# Patient Record
Sex: MALE | Race: WHITE | NOT HISPANIC OR LATINO | Employment: FULL TIME | URBAN - METROPOLITAN AREA
[De-identification: names, ages, dates, MRNs, and addresses within clinical notes are randomized per-mention and may not be internally consistent; named-entity substitution may affect disease eponyms.]

---

## 2018-01-14 ENCOUNTER — APPOINTMENT (OUTPATIENT)
Dept: GENERAL RADIOLOGY | Facility: HOSPITAL | Age: 53
End: 2018-01-14

## 2018-01-14 ENCOUNTER — HOSPITAL ENCOUNTER (EMERGENCY)
Facility: HOSPITAL | Age: 53
Discharge: HOME | End: 2018-01-14
Attending: EMERGENCY MEDICINE | Admitting: EMERGENCY MEDICINE

## 2018-01-14 VITALS
WEIGHT: 315 LBS | BODY MASS INDEX: 49.44 KG/M2 | SYSTOLIC BLOOD PRESSURE: 156 MMHG | DIASTOLIC BLOOD PRESSURE: 76 MMHG | RESPIRATION RATE: 18 BRPM | TEMPERATURE: 98.9 F | HEIGHT: 67 IN | OXYGEN SATURATION: 99 % | HEART RATE: 58 BPM

## 2018-01-14 DIAGNOSIS — R06.02 SHORTNESS OF BREATH: Primary | ICD-10-CM

## 2018-01-14 LAB
ALBUMIN SERPL-MCNC: 4.2 G/DL (ref 3.5–5.2)
ALBUMIN/GLOB SERPL: 1.1 G/DL
ALP SERPL-CCNC: 82 U/L (ref 39–117)
ALT SERPL W P-5'-P-CCNC: 49 U/L (ref 1–41)
ANION GAP SERPL CALCULATED.3IONS-SCNC: 17.4 MMOL/L
AST SERPL-CCNC: 27 U/L (ref 1–40)
BASOPHILS # BLD AUTO: 0.04 10*3/MM3 (ref 0–0.2)
BASOPHILS NFR BLD AUTO: 0.4 % (ref 0–1.5)
BILIRUB SERPL-MCNC: 0.5 MG/DL (ref 0.1–1.2)
BUN BLD-MCNC: 24 MG/DL (ref 6–20)
BUN/CREAT SERPL: 21.8 (ref 7–25)
CALCIUM SPEC-SCNC: 9.3 MG/DL (ref 8.6–10.5)
CHLORIDE SERPL-SCNC: 99 MMOL/L (ref 98–107)
CO2 SERPL-SCNC: 23.6 MMOL/L (ref 22–29)
CREAT BLD-MCNC: 1.1 MG/DL (ref 0.76–1.27)
DEPRECATED RDW RBC AUTO: 48.3 FL (ref 37–54)
EOSINOPHIL # BLD AUTO: 0.29 10*3/MM3 (ref 0–0.7)
EOSINOPHIL NFR BLD AUTO: 2.7 % (ref 0.3–6.2)
ERYTHROCYTE [DISTWIDTH] IN BLOOD BY AUTOMATED COUNT: 14.8 % (ref 11.5–14.5)
GFR SERPL CREATININE-BSD FRML MDRD: 71 ML/MIN/1.73
GLOBULIN UR ELPH-MCNC: 3.8 GM/DL
GLUCOSE BLD-MCNC: 102 MG/DL (ref 65–99)
HCT VFR BLD AUTO: 51.2 % (ref 40.4–52.2)
HGB BLD-MCNC: 16.8 G/DL (ref 13.7–17.6)
HOLD SPECIMEN: NORMAL
IMM GRANULOCYTES # BLD: 0.03 10*3/MM3 (ref 0–0.03)
IMM GRANULOCYTES NFR BLD: 0.3 % (ref 0–0.5)
LYMPHOCYTES # BLD AUTO: 2.8 10*3/MM3 (ref 0.9–4.8)
LYMPHOCYTES NFR BLD AUTO: 26.4 % (ref 19.6–45.3)
MCH RBC QN AUTO: 29.4 PG (ref 27–32.7)
MCHC RBC AUTO-ENTMCNC: 32.8 G/DL (ref 32.6–36.4)
MCV RBC AUTO: 89.7 FL (ref 79.8–96.2)
MONOCYTES # BLD AUTO: 0.8 10*3/MM3 (ref 0.2–1.2)
MONOCYTES NFR BLD AUTO: 7.5 % (ref 5–12)
NEUTROPHILS # BLD AUTO: 6.64 10*3/MM3 (ref 1.9–8.1)
NEUTROPHILS NFR BLD AUTO: 62.7 % (ref 42.7–76)
NT-PROBNP SERPL-MCNC: 36.9 PG/ML (ref 0–900)
PLATELET # BLD AUTO: 220 10*3/MM3 (ref 140–500)
PMV BLD AUTO: 10.8 FL (ref 6–12)
POTASSIUM BLD-SCNC: 4.1 MMOL/L (ref 3.5–5.2)
PROT SERPL-MCNC: 8 G/DL (ref 6–8.5)
RBC # BLD AUTO: 5.71 10*6/MM3 (ref 4.6–6)
SODIUM BLD-SCNC: 140 MMOL/L (ref 136–145)
TROPONIN T SERPL-MCNC: <0.01 NG/ML (ref 0–0.03)
TROPONIN T SERPL-MCNC: <0.01 NG/ML (ref 0–0.03)
WBC NRBC COR # BLD: 10.6 10*3/MM3 (ref 4.5–10.7)
WHOLE BLOOD HOLD SPECIMEN: NORMAL

## 2018-01-14 PROCEDURE — 93010 ELECTROCARDIOGRAM REPORT: CPT | Performed by: INTERNAL MEDICINE

## 2018-01-14 PROCEDURE — 83880 ASSAY OF NATRIURETIC PEPTIDE: CPT | Performed by: EMERGENCY MEDICINE

## 2018-01-14 PROCEDURE — 80053 COMPREHEN METABOLIC PANEL: CPT | Performed by: EMERGENCY MEDICINE

## 2018-01-14 PROCEDURE — 71046 X-RAY EXAM CHEST 2 VIEWS: CPT

## 2018-01-14 PROCEDURE — 84484 ASSAY OF TROPONIN QUANT: CPT | Performed by: EMERGENCY MEDICINE

## 2018-01-14 PROCEDURE — 85025 COMPLETE CBC W/AUTO DIFF WBC: CPT | Performed by: EMERGENCY MEDICINE

## 2018-01-14 PROCEDURE — 93005 ELECTROCARDIOGRAM TRACING: CPT | Performed by: EMERGENCY MEDICINE

## 2018-01-14 PROCEDURE — 99284 EMERGENCY DEPT VISIT MOD MDM: CPT

## 2018-01-14 RX ORDER — SODIUM CHLORIDE 0.9 % (FLUSH) 0.9 %
10 SYRINGE (ML) INJECTION AS NEEDED
Status: DISCONTINUED | OUTPATIENT
Start: 2018-01-14 | End: 2018-01-14 | Stop reason: HOSPADM

## 2018-01-14 NOTE — ED PROVIDER NOTES
" EMERGENCY DEPARTMENT ENCOUNTER    CHIEF COMPLAINT  Chief Complaint: SOA  History given by: Pt  History limited by: Nothing  Room Number: 41/41  PMD: Jesse Rae MD      HPI:  Pt is a 52 y.o. male with a hx of SVT and hypertension who presents complaining of a sudden onset of SOA onset about four hours ago which lasted about 45 minutes. He also complains of light-headedness and tingling in his bilateral upper extremities which has now resolved. Pt states he also has been experiencing similar episodes throughout the week which lasted a few seconds each. He reports hx of childhood asthma and his most recent \"asthma attack\" being a couple of years ago. Pt denies CP, cough, sore throat, N/V/D, or any other symptoms at this time.    Duration:  Four hours  Onset: sudden  Timing: constant  Location: n/a  Radiation: none  Quality: \"SOA\"  Intensity/Severity: moderate  Progression: resolved  Associated Symptoms: light-headedness and tingling in bilateral upper extremities  Aggravating Factors: none  Alleviating Factors: none  Previous Episodes: none  Treatment before arrival: Pt received no treatment PTA.    PAST MEDICAL HISTORY  Active Ambulatory Problems     Diagnosis Date Noted   • No Active Ambulatory Problems     Resolved Ambulatory Problems     Diagnosis Date Noted   • No Resolved Ambulatory Problems     Past Medical History:   Diagnosis Date   • Cellulitis    • Hypertension    • Obesity    • Psoriasis    • SVT (supraventricular tachycardia)        PAST SURGICAL HISTORY  Past Surgical History:   Procedure Laterality Date   • CHOLECYSTECTOMY     • EYE SURGERY     • GASTRIC BANDING         FAMILY HISTORY  History reviewed. No pertinent family history.    SOCIAL HISTORY  Social History     Social History   • Marital status:      Spouse name: N/A   • Number of children: N/A   • Years of education: N/A     Occupational History   • Not on file.     Social History Main Topics   • Smoking status: Current Some Day " Smoker     Types: Cigars   • Smokeless tobacco: Not on file   • Alcohol use Yes      Comment: 1-2 daily   • Drug use: No   • Sexual activity: Defer     Other Topics Concern   • Not on file     Social History Narrative   • No narrative on file       ALLERGIES  Review of patient's allergies indicates no known allergies.    REVIEW OF SYSTEMS  Review of Systems   Constitutional: Negative for activity change, appetite change and fever.   HENT: Negative for congestion and sore throat.    Eyes: Negative.    Respiratory: Positive for shortness of breath. Negative for cough.    Cardiovascular: Negative for chest pain and leg swelling.   Gastrointestinal: Negative for abdominal pain, diarrhea and vomiting.   Endocrine: Negative.    Genitourinary: Negative for decreased urine volume and dysuria.   Musculoskeletal: Negative for neck pain.   Skin: Negative for rash and wound.   Allergic/Immunologic: Negative.    Neurological: Positive for light-headedness and numbness (described as tingling in bilateral upper extremities). Negative for weakness and headaches.   Hematological: Negative.    Psychiatric/Behavioral: Negative.    All other systems reviewed and are negative.      PHYSICAL EXAM  ED Triage Vitals   Temp Heart Rate Resp BP SpO2   01/14/18 1448 01/14/18 1448 01/14/18 1448 01/14/18 1456 01/14/18 1448   99 °F (37.2 °C) 100 18 175/96 100 %      Temp src Heart Rate Source Patient Position BP Location FiO2 (%)   01/14/18 1448 01/14/18 1448 01/14/18 1456 01/14/18 1456 --   Tympanic Monitor Sitting Left arm        Physical Exam   Constitutional: He is oriented to person, place, and time and well-developed, well-nourished, and in no distress. No distress.   HENT:   Head: Normocephalic and atraumatic.   Eyes: EOM are normal. Pupils are equal, round, and reactive to light.   Neck: Normal range of motion. Neck supple.   Cardiovascular: Normal rate, regular rhythm and normal heart sounds.    Pulmonary/Chest: Effort normal and breath  sounds normal. No respiratory distress.   Abdominal: Soft. There is no tenderness. There is no rebound and no guarding.   Musculoskeletal: Normal range of motion. He exhibits edema (trace edema). He exhibits no tenderness (no calf tenderness).   Neurological: He is alert and oriented to person, place, and time. He has normal sensation and normal strength.   Skin: Skin is warm and dry.   Psychiatric: Mood and affect normal.   Nursing note and vitals reviewed.      LAB RESULTS  Lab Results (last 24 hours)     Procedure Component Value Units Date/Time    CBC & Differential [[id number]] Collected:  01/14/18 1509    Specimen:  Blood Updated:  01/14/18 1548    Narrative:       The following orders were created for panel order CBC & Differential.  Procedure                               Abnormality         Status                     ---------                               -----------         ------                     CBC Auto Differential[[id number]]        Abnormal            Final result                 Please view results for these tests on the individual orders.    Comprehensive Metabolic Panel [[id number]]  (Abnormal) Collected:  01/14/18 1509    Specimen:  Blood Updated:  01/14/18 1609     Glucose 102 (H) mg/dL      BUN 24 (H) mg/dL      Creatinine 1.10 mg/dL      Sodium 140 mmol/L      Potassium 4.1 mmol/L      Chloride 99 mmol/L      CO2 23.6 mmol/L      Calcium 9.3 mg/dL      Total Protein 8.0 g/dL      Albumin 4.20 g/dL      ALT (SGPT) 49 (H) U/L      AST (SGOT) 27 U/L      Alkaline Phosphatase 82 U/L      Total Bilirubin 0.5 mg/dL      eGFR Non African Amer 71 mL/min/1.73      Globulin 3.8 gm/dL      A/G Ratio 1.1 g/dL      BUN/Creatinine Ratio 21.8     Anion Gap 17.4 mmol/L     BNP [[id number]]  (Normal) Collected:  01/14/18 1509    Specimen:  Blood Updated:  01/14/18 1607     proBNP 36.9 pg/mL     Narrative:       Among patients with dyspnea, NT-proBNP is highly sensitive for the detection of acute congestive  heart failure. In addition NT-proBNP of <300 pg/ml effectively rules out acute congestive heart failure with 99% negative predictive value.    Troponin [[id number]]  (Normal) Collected:  01/14/18 1509    Specimen:  Blood Updated:  01/14/18 1609     Troponin T <0.010 ng/mL     Narrative:       Troponin T Reference Ranges:  Less than 0.03 ng/mL:    Negative for AMI  0.03 to 0.09 ng/mL:      Indeterminant for AMI  Greater than 0.09 ng/mL: Positive for AMI    CBC Auto Differential [[id number]]  (Abnormal) Collected:  01/14/18 1509    Specimen:  Blood Updated:  01/14/18 1545     WBC 10.60 10*3/mm3      RBC 5.71 10*6/mm3      Hemoglobin 16.8 g/dL      Hematocrit 51.2 %      MCV 89.7 fL      MCH 29.4 pg      MCHC 32.8 g/dL      RDW 14.8 (H) %      RDW-SD 48.3 fl      MPV 10.8 fL      Platelets 220 10*3/mm3      Neutrophil % 62.7 %      Lymphocyte % 26.4 %      Monocyte % 7.5 %      Eosinophil % 2.7 %      Basophil % 0.4 %      Immature Grans % 0.3 %      Neutrophils, Absolute 6.64 10*3/mm3      Lymphocytes, Absolute 2.80 10*3/mm3      Monocytes, Absolute 0.80 10*3/mm3      Eosinophils, Absolute 0.29 10*3/mm3      Basophils, Absolute 0.04 10*3/mm3      Immature Grans, Absolute 0.03 10*3/mm3     Troponin [[id number]]  (Normal) Collected:  01/14/18 1841    Specimen:  Blood Updated:  01/14/18 1917     Troponin T <0.010 ng/mL     Narrative:       Troponin T Reference Ranges:  Less than 0.03 ng/mL:    Negative for AMI  0.03 to 0.09 ng/mL:      Indeterminant for AMI  Greater than 0.09 ng/mL: Positive for AMI          I ordered the above labs and reviewed the results    RADIOLOGY  XR Chest 2 View   Final Result   TWO-VIEW CHEST     HISTORY: Chest pain.     FINDINGS: The lungs are well-expanded and clear and the heart and hilar  structures are normal. No acute abnormality is seen.     This report was finalized on 1/14/2018 4:03 PM by Dr. José Luis Miller MD.           I ordered the above noted radiological studies. Interpreted by  radiologist. Reviewed by me in PACS.       PROCEDURES  Procedures    EKG           EKG time: 1758  Rhythm/Rate: Sinus bradycardia, 56  P waves and RI: nml  QRS, axis: FAVB, RBBB   ST and T waves: nml     Interpreted Contemporaneously by me, independently viewed  No AV Block at that time compared to prior 8/22/2006    PROGRESS AND CONSULTS  ED Course     1458 Ordered CXR, CBC, Troponin, CMP, and BNP for further evaluation    1811 Ordered repeat troponin for further evaluation    1930 BP- 135/80 HR- 56 Temp- 99 °F (37.2 °C) (Tympanic) O2 sat- 96%. Rechecked the patient who is in NAD and is resting comfortably. He denies any symptoms at this time. Informed pt of negative repeat troponin and unremarkable lab/ imaging results. Informed pt the tingling sensation he was feeling was most likely secondary to hyperventilation. Suggested pt follow up with his PCP. Pt understands and agrees with treatment. All questions and concerns were addressed at this time.    MEDICAL DECISION MAKING  Results were reviewed/discussed with the patient and they were also made aware of online access. Pt also made aware that some labs, such as cultures, will not be resulted during ER visit and follow up with PMD is necessary.     MDM  Number of Diagnoses or Management Options  Shortness of breath:   Diagnosis management comments: Troponin was negative ×2.  Chest x-ray was unremarkable.  Patient was asymptomatic in the ER.  He was not tachycardic, tachypneic, or hypoxic.  His symptoms were not suggestive of a pulmonary embolus.  Patient will be discharged and advised follow-up with his primary care doctor as well as his cardiologist for further evaluation.       Amount and/or Complexity of Data Reviewed  Clinical lab tests: ordered and reviewed (BNP - 36.9, Troponin - <.010, WBC- 10.60)  Tests in the radiology section of CPT®: reviewed and ordered (CXR shows NAD.)  Tests in the medicine section of CPT®: reviewed and ordered (See EKG procedure  note.)  Decide to obtain previous medical records or to obtain history from someone other than the patient: yes  Review and summarize past medical records: yes  Independent visualization of images, tracings, or specimens: yes    Patient Progress  Patient progress: stable         DIAGNOSIS  Final diagnoses:   Shortness of breath       DISPOSITION  DISCHARGE    Patient discharged in stable condition.    Reviewed implications of results, diagnosis, meds, responsibility to follow up, warning signs and symptoms of possible worsening, potential complications and reasons to return to ER, including new or worsening symptoms.    Patient/Family voiced understanding of above instructions.    Discussed plan for discharge, as there is no emergent indication for admission.  Pt/family is agreeable and understands need for follow up and repeat testing.  Pt is aware that discharge does not mean that nothing is wrong but it indicates no emergency is present that requires admission and they must continue care with follow-up as given below or physician of their choice.     FOLLOW-UP  Jesse Rae MD  [address] [facility] [address]  [facility] [id number] [phone]    Schedule an appointment as soon as possible for a visit      James M Kammerling, MD  [address]  [facility] [id number] [phone]    Schedule an appointment as soon as possible for a visit           Medication List      Stop          oxyCODONE-acetaminophen 5-325 MG per tablet   Commonly known as:  PERCOCET               Latest Documented Vital Signs:  As of 7:37 PM  BP- 135/80 HR- 56 Temp- 99 °F (37.2 °C) (Tympanic) O2 sat- 96%    --  Documentation assistance provided by bartolome Judd for Dr. Barney.  Information recorded by the scribe was done at my direction and has been verified and validated by me.     Tavia Judd  01/14/18 1937       Corey Barney MD  01/15/18 [id number]

## 2018-01-14 NOTE — ED NOTES
"Pt reports intermittent difficulty breathing that began today when he was drving; denies lung disease. He reports what he thinks is \"muscuar pectoris pain.\" Denies n/v, fever or chills. Reassurance given; call light in reach. Pts breathing even and unlabored. Pt appears in NAD at this time. Family at bedside.        Kelsea Fish RN  01/14/18 2716    "

## 2018-01-15 NOTE — DISCHARGE INSTRUCTIONS
Follow-up with your primary care physician and cardiologist.  Return to emergency department for worsening shortness of breath, chest pain, fever, or other concern.

## 2021-03-26 ENCOUNTER — BULK ORDERING (OUTPATIENT)
Dept: CASE MANAGEMENT | Facility: OTHER | Age: 56
End: 2021-03-26

## 2021-03-26 DIAGNOSIS — Z23 IMMUNIZATION DUE: ICD-10-CM
